# Patient Record
Sex: FEMALE | Race: WHITE | NOT HISPANIC OR LATINO | Employment: OTHER | URBAN - METROPOLITAN AREA
[De-identification: names, ages, dates, MRNs, and addresses within clinical notes are randomized per-mention and may not be internally consistent; named-entity substitution may affect disease eponyms.]

---

## 2023-12-26 ENCOUNTER — OFFICE VISIT (OUTPATIENT)
Dept: URGENT CARE | Facility: URGENT CARE | Age: 78
End: 2023-12-26
Payer: COMMERCIAL

## 2023-12-26 VITALS
BODY MASS INDEX: 19.29 KG/M2 | WEIGHT: 120 LBS | HEIGHT: 66 IN | HEART RATE: 74 BPM | DIASTOLIC BLOOD PRESSURE: 75 MMHG | SYSTOLIC BLOOD PRESSURE: 117 MMHG | TEMPERATURE: 98.1 F | OXYGEN SATURATION: 99 %

## 2023-12-26 DIAGNOSIS — S16.1XXD NECK STRAIN, SUBSEQUENT ENCOUNTER: Primary | ICD-10-CM

## 2023-12-26 DIAGNOSIS — M54.2 ACUTE NECK PAIN: ICD-10-CM

## 2023-12-26 PROCEDURE — 99203 OFFICE O/P NEW LOW 30 MIN: CPT | Performed by: PHYSICIAN ASSISTANT

## 2023-12-26 RX ORDER — CYCLOSPORINE 0.5 MG/ML
EMULSION OPHTHALMIC
COMMUNITY
Start: 2023-10-09

## 2023-12-26 RX ORDER — METOPROLOL SUCCINATE 25 MG/1
1 TABLET, EXTENDED RELEASE ORAL DAILY
COMMUNITY
Start: 2023-11-27

## 2023-12-26 RX ORDER — MONTELUKAST SODIUM 10 MG/1
10 TABLET ORAL DAILY
COMMUNITY
Start: 2023-11-27

## 2023-12-26 RX ORDER — OXYCODONE AND ACETAMINOPHEN 5; 325 MG/1; MG/1
1 TABLET ORAL EVERY 6 HOURS PRN
Qty: 12 TABLET | Refills: 0 | Status: SHIPPED | OUTPATIENT
Start: 2023-12-26 | End: 2023-12-29

## 2023-12-26 NOTE — PROGRESS NOTES
Assessment & Plan        1. Neck strain, subsequent encounter    -I have given patient a short course of Percocet treatment to last for 3 days.  No refills.  Patient should follow-up with Spine Specialist for further instructions and further evaluation.  - oxyCODONE-acetaminophen (PERCOCET) 5-325 MG tablet; Take 1 tablet by mouth every 6 hours as needed for pain  Dispense: 12 tablet; Refill: 0    2. Acute neck pain    - oxyCODONE-acetaminophen (PERCOCET) 5-325 MG tablet; Take 1 tablet by mouth every 6 hours as needed for pain  Dispense: 12 tablet; Refill: 0      Patient Instructions   Follow up with Spine Specialist for further evaluation     Return for Follow up with Spine Doctor.    At the end of the encounter, I discussed results, diagnosis, medications. Discussed red flags for immediate return to clinic/ER, as well as indications for follow up if no improvement. Patient understood and agreed to plan. Patient was stable for discharge.    Carlee Rondon is a 78 year old female who presents to clinic today for the following health issues:  Chief Complaint   Patient presents with    Urgent Care    Neck Pain     Neck pain that started in October, was seen at ED and told it was acute cervical strain  Pain has continued and worsened over the weekend      HPI  Patient reports neck pain, right worse than left. She was seen in the ED two months ago and has been following up  with Spine Specialist for neck pain. She has been doing physical therapy which has been helping. She is here today because the pain has worsened for the past two days.  No recent trauma or injury.  She notes pain with movement of the neck.  She is unable to move his neck from side-to-side due to pain. She rates the pain at 10/10 when worse .She notes trying flexeril which did not help. She has tried percocet 5/325 mg in the past which helped.  She denies fever, chills, headache, nausea, vomiting , lightheadedness or dizziness.        Review of  "Systems    Problem List:  There are no relevant problems documented for this patient.      No past medical history on file.    Social History     Tobacco Use    Smoking status: Never    Smokeless tobacco: Never   Substance Use Topics    Alcohol use: Not on file           Objective    /75   Pulse 74   Temp 98.1  F (36.7  C) (Temporal)   Ht 1.664 m (5' 5.5\")   Wt 54.4 kg (120 lb)   SpO2 99%   BMI 19.67 kg/m    Physical Exam  Vitals and nursing note reviewed.   Constitutional:       Appearance: Normal appearance.   Neck:     Cardiovascular:      Rate and Rhythm: Normal rate and regular rhythm.   Pulmonary:      Effort: Pulmonary effort is normal.      Breath sounds: Normal breath sounds.   Musculoskeletal:      Cervical back: Pain with movement and muscular tenderness present. No spinous process tenderness. Decreased range of motion.   Skin:     General: Skin is warm and dry.      Findings: No rash.   Neurological:      General: No focal deficit present.      Mental Status: She is alert and oriented to person, place, and time.      Cranial Nerves: Cranial nerve deficit present.      Sensory: Sensation is intact.      Motor: Weakness present.      Comments: Strength is 4/5 with head rotation an shoulder elevation bilaterally    Psychiatric:         Mood and Affect: Mood normal.         Behavior: Behavior normal.              Mary Lou Bahena PA-C    "

## 2024-07-11 ENCOUNTER — OFFICE VISIT (OUTPATIENT)
Dept: URGENT CARE | Facility: URGENT CARE | Age: 79
End: 2024-07-11
Payer: MEDICARE

## 2024-07-11 VITALS
DIASTOLIC BLOOD PRESSURE: 70 MMHG | TEMPERATURE: 97.5 F | HEART RATE: 74 BPM | WEIGHT: 125 LBS | SYSTOLIC BLOOD PRESSURE: 118 MMHG | BODY MASS INDEX: 20.09 KG/M2 | OXYGEN SATURATION: 99 % | RESPIRATION RATE: 16 BRPM | HEIGHT: 66 IN
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
## 2024-07-11 DIAGNOSIS — M25.562 ACUTE PAIN OF BOTH KNEES: Primary | ICD-10-CM

## 2024-07-11 DIAGNOSIS — M25.561 ACUTE PAIN OF BOTH KNEES: Primary | ICD-10-CM

## 2024-07-11 PROCEDURE — 99213 OFFICE O/P EST LOW 20 MIN: CPT | Performed by: PHYSICIAN ASSISTANT

## 2024-07-11 NOTE — PATIENT INSTRUCTIONS
Ice as needed and after activity.  Modify activity.  Mild pain is okay as long as it resolves after a minute or 2 of discontinuing the activity.  Decreased level activity if pain is moderate to severe or last longer than a few minutes after discontinuing.  Wear knee sleeves or brace like we showed you in the clinic  Follow-up with PT    Tylenol 500-1000 mg every 8 hours as needed    Voltaren gel 4 times a day for the next 1 to 2 weeks can be effective.  Has minimal side effects but can take a few days to begin working and have to be diligent about application.

## 2024-07-11 NOTE — PROGRESS NOTES
"Chief Complaint   Patient presents with    Urgent Care     Was doing an exercise on Tuesday evening. Knees gave out. Was okay yesterday but knees are in pain today, right side is worse.        ASSESSMENT/PLAN:  Nela was seen today for urgent care.    Diagnoses and all orders for this visit:    Acute pain of both knees  -     Physical Therapy  Referral; Future    Fall, initial encounter  -     Physical Therapy  Referral; Future    Bilateral MCL, LCL intact.  Normal lever test.  Slight swelling of the right superior medial aspect of the knee with some tenderness.  Suspect soft tissue injury from sudden flexion    Ice as needed and after activity.  Modify activity.  Mild pain is okay as long as it resolves after a minute or 2 of discontinuing the activity.  Decreased level activity if pain is moderate to severe or last longer than a few minutes after discontinuing.  Wear knee sleeves or brace like we showed you in the clinic  Follow-up with PT    Tylenol 500-1000 mg every 8 hours as needed    Voltaren gel 4 times a day for the next 1 to 2 weeks can be effective.  Has minimal side effects but can take a few days to begin working and have to be diligent about application.    Karthik Meng PA-C      SUBJECTIVE:  Nela is a 79 year old female who presents to urgent care with bilateral knee pain.  2 days ago she was doing wall squats when her knees gave out and she landed on her butt.  She had some mild pain initially afterward but was able to walk.  The next day she became more sore and stiff which has continued today.  Has been icing, wearing Ace bandages and decreasing activity level.    ROS: Pertinent ROS neg other than the symptoms noted above in the HPI.     OBJECTIVE:  /70   Pulse 74   Temp 97.5  F (36.4  C) (Temporal)   Resp 16   Ht 1.664 m (5' 5.5\")   Wt 56.7 kg (125 lb)   SpO2 99%   BMI 20.48 kg/m     GENERAL: alert and no distress  MS: Walks slowly,  Right knee:  MCL, LCL intact.  " Normal lever test.  Slight swelling of the right superior medial aspect of the knee with some tenderness.  No bony tenderness.  No pain with passive range of motion  Left knee: MCL, LCL intact.  Normal lever test.  No bony tenderness.  No pain with passive range of motion  SKIN: no suspicious lesions or rashes  NEURO: Normal strength and tone, mentation intact and speech normal    DIAGNOSTICS    No results found for any visits on 07/11/24.     Current Outpatient Medications   Medication Sig Dispense Refill    cycloSPORINE (RESTASIS) 0.05 % ophthalmic emulsion       FLUoxetine (PROZAC) 20 MG capsule take 1 tab po daily (20 mg) during spring and summer months, and increase to 2 tabs po daily (40 mg) during fall and winter months      metoprolol succinate ER (TOPROL XL) 25 MG 24 hr tablet Take 1 tablet by mouth daily      montelukast (SINGULAIR) 10 MG tablet Take 10 mg by mouth daily      rivaroxaban ANTICOAGULANT (XARELTO) 20 MG TABS tablet Take 20 mg by mouth       No current facility-administered medications for this visit.      There is no problem list on file for this patient.     No past medical history on file.  No past surgical history on file.  No family history on file.  Social History     Tobacco Use    Smoking status: Never    Smokeless tobacco: Never   Substance Use Topics    Alcohol use: Not on file              The plan of care was discussed with the patient. They understand and agree with the course of treatment prescribed. A printed summary was given including instructions and medications.  The use of Dragon/Jebbit dictation services may have been used to construct the content in this note; any grammatical or spelling errors are non-intentional. Please contact the author of this note directly if you are in need of any clarification.

## 2025-05-20 ENCOUNTER — RESULTS FOLLOW-UP (OUTPATIENT)
Dept: URGENT CARE | Facility: URGENT CARE | Age: 80
End: 2025-05-20

## 2025-05-20 ENCOUNTER — ANCILLARY PROCEDURE (OUTPATIENT)
Dept: GENERAL RADIOLOGY | Facility: CLINIC | Age: 80
End: 2025-05-20
Attending: FAMILY MEDICINE
Payer: COMMERCIAL

## 2025-05-20 ENCOUNTER — OFFICE VISIT (OUTPATIENT)
Dept: URGENT CARE | Facility: URGENT CARE | Age: 80
End: 2025-05-20
Payer: MEDICARE

## 2025-05-20 VITALS
SYSTOLIC BLOOD PRESSURE: 129 MMHG | RESPIRATION RATE: 16 BRPM | HEART RATE: 69 BPM | BODY MASS INDEX: 20.49 KG/M2 | WEIGHT: 123 LBS | OXYGEN SATURATION: 100 % | HEIGHT: 65 IN | TEMPERATURE: 99 F | DIASTOLIC BLOOD PRESSURE: 71 MMHG

## 2025-05-20 DIAGNOSIS — J18.9 ATYPICAL PNEUMONIA: ICD-10-CM

## 2025-05-20 DIAGNOSIS — M25.561 RIGHT KNEE PAIN, UNSPECIFIED CHRONICITY: ICD-10-CM

## 2025-05-20 DIAGNOSIS — R52 BODY ACHES: Primary | ICD-10-CM

## 2025-05-20 DIAGNOSIS — R50.9 FEVER IN ADULT: ICD-10-CM

## 2025-05-20 LAB
ALBUMIN SERPL-MCNC: 3.4 G/DL (ref 3.4–5)
ALBUMIN UR-MCNC: NEGATIVE MG/DL
ALP SERPL-CCNC: 67 U/L (ref 40–150)
ALT SERPL W P-5'-P-CCNC: 16 U/L (ref 0–50)
ANION GAP SERPL CALCULATED.3IONS-SCNC: 12 MMOL/L (ref 3–14)
APPEARANCE UR: CLEAR
AST SERPL W P-5'-P-CCNC: 28 U/L (ref 0–45)
BASOPHILS # BLD AUTO: 0 10E3/UL (ref 0–0.2)
BASOPHILS NFR BLD AUTO: 0 %
BILIRUB SERPL-MCNC: 0.8 MG/DL (ref 0.2–1.3)
BILIRUB UR QL STRIP: NEGATIVE
BUN SERPL-MCNC: 15 MG/DL (ref 7–30)
CALCIUM SERPL-MCNC: 9.3 MG/DL (ref 8.5–10.1)
CHLORIDE BLD-SCNC: 102 MMOL/L (ref 94–109)
CO2 SERPL-SCNC: 27 MMOL/L (ref 20–32)
COLOR UR AUTO: YELLOW
CREAT SERPL-MCNC: 0.9 MG/DL (ref 0.52–1.04)
EGFRCR SERPLBLD CKD-EPI 2021: 65 ML/MIN/1.73M2
EOSINOPHIL # BLD AUTO: 0 10E3/UL (ref 0–0.7)
EOSINOPHIL NFR BLD AUTO: 1 %
ERYTHROCYTE [DISTWIDTH] IN BLOOD BY AUTOMATED COUNT: 12.7 % (ref 10–15)
GLUCOSE BLD-MCNC: 96 MG/DL (ref 70–99)
GLUCOSE UR STRIP-MCNC: NEGATIVE MG/DL
HCT VFR BLD AUTO: 39.1 % (ref 35–47)
HGB BLD-MCNC: 13 G/DL (ref 11.7–15.7)
HGB UR QL STRIP: NEGATIVE
IMM GRANULOCYTES # BLD: 0 10E3/UL
IMM GRANULOCYTES NFR BLD: 0 %
KETONES UR STRIP-MCNC: NEGATIVE MG/DL
LEUKOCYTE ESTERASE UR QL STRIP: NEGATIVE
LYMPHOCYTES # BLD AUTO: 1.7 10E3/UL (ref 0.8–5.3)
LYMPHOCYTES NFR BLD AUTO: 28 %
MCH RBC QN AUTO: 33.5 PG (ref 26.5–33)
MCHC RBC AUTO-ENTMCNC: 33.2 G/DL (ref 31.5–36.5)
MCV RBC AUTO: 101 FL (ref 78–100)
MONOCYTES # BLD AUTO: 1.1 10E3/UL (ref 0–1.3)
MONOCYTES NFR BLD AUTO: 18 %
NEUTROPHILS # BLD AUTO: 3.2 10E3/UL (ref 1.6–8.3)
NEUTROPHILS NFR BLD AUTO: 53 %
NITRATE UR QL: NEGATIVE
PH UR STRIP: 6 [PH] (ref 5–7)
PLATELET # BLD AUTO: 264 10E3/UL (ref 150–450)
POTASSIUM BLD-SCNC: 4.8 MMOL/L (ref 3.4–5.3)
PROT SERPL-MCNC: 7.6 G/DL (ref 6.8–8.8)
RBC # BLD AUTO: 3.88 10E6/UL (ref 3.8–5.2)
SODIUM SERPL-SCNC: 141 MMOL/L (ref 135–145)
SP GR UR STRIP: 1.01 (ref 1–1.03)
UROBILINOGEN UR STRIP-ACNC: 0.2 E.U./DL
WBC # BLD AUTO: 6.1 10E3/UL (ref 4–11)

## 2025-05-20 PROCEDURE — 3078F DIAST BP <80 MM HG: CPT | Performed by: FAMILY MEDICINE

## 2025-05-20 PROCEDURE — 3074F SYST BP LT 130 MM HG: CPT | Performed by: FAMILY MEDICINE

## 2025-05-20 PROCEDURE — 36415 COLL VENOUS BLD VENIPUNCTURE: CPT | Performed by: FAMILY MEDICINE

## 2025-05-20 PROCEDURE — 99214 OFFICE O/P EST MOD 30 MIN: CPT | Performed by: FAMILY MEDICINE

## 2025-05-20 PROCEDURE — 71046 X-RAY EXAM CHEST 2 VIEWS: CPT | Mod: TC | Performed by: RADIOLOGY

## 2025-05-20 PROCEDURE — 81003 URINALYSIS AUTO W/O SCOPE: CPT | Performed by: FAMILY MEDICINE

## 2025-05-20 PROCEDURE — 80053 COMPREHEN METABOLIC PANEL: CPT | Performed by: FAMILY MEDICINE

## 2025-05-20 PROCEDURE — 85025 COMPLETE CBC W/AUTO DIFF WBC: CPT | Performed by: FAMILY MEDICINE

## 2025-05-20 RX ORDER — AZITHROMYCIN 250 MG/1
TABLET, FILM COATED ORAL
Qty: 6 TABLET | Refills: 0 | Status: SHIPPED | OUTPATIENT
Start: 2025-05-20 | End: 2025-05-25

## 2025-05-20 NOTE — PROGRESS NOTES
Urgent Care Clinic Visit    Chief Complaint   Patient presents with    Fever     X5 days of on and fever     Generalized Body Aches     Body aches   Worse in right knee and lower back     Urgent Care               5/20/2025     2:21 PM   Additional Questions   Roomed by aston browning

## 2025-05-20 NOTE — PROGRESS NOTES
Chief Complaint   Patient presents with    Fever     X5 days of on and fever     Generalized Body Aches     Body aches   Worse in right knee and lower back     Urgent Care     Nela was seen today for fever, generalized body aches and urgent care.    Diagnoses and all orders for this visit:    Body aches    Fever in adult  -     CBC with platelets and differential; Future  -     Comprehensive metabolic panel (BMP + Alb, Alk Phos, ALT, AST, Total. Bili, TP); Future  -     UA Macroscopic with reflex to Microscopic and Culture - Clinic Collect  -     XR Chest 2 Views  -     CBC with platelets and differential  -     Comprehensive metabolic panel (BMP + Alb, Alk Phos, ALT, AST, Total. Bili, TP)    Right knee pain, unspecified chronicity    Atypical pneumonia  -     azithromycin (ZITHROMAX) 250 MG tablet; Take 2 tablets (500 mg) by mouth daily for 1 day, THEN 1 tablet (250 mg) daily for 4 days.      D/d  Bronchitis-viral, Influenza, Pneumonia, Viral pharyngitis, Viral syndrome, Viral upper respiratory illness, uti, pyelonephritis, viral myalgia, septic arthritis,bakers cyst    PLAN:  Labs reviewed with patient no abnormality noted x-ray did show mild opacity in the right lower lung but there is no baseline x-ray to compare with patient does not have any URI symptoms will wait for the official read.  Discussed with patient to repeat a COVID test, push more fluids, the pain behind the knee could be related to Baker's cyst at this point no further testing would be needed.  Symptomatic therapy suggested: push fluids, rest, use acetaminophen as needed, and Return office visit if symptoms persist or worsen. Call or return to clinic prn if these symptoms worsen or fail to improve as anticipated.    did spent>35 minutes with patient and > 50% of the time was for answering questions, discussing findings, counseling and coordination of care     SUBJECTIVE:   Nela Mera is a 79 year old female of A-fib on 2 is here in the  clinic with history of 6 days of on and off fever with generalized myalgia, left low back pain then the back pain spread to the entire lower back along with right knee pain.  She denies any URI symptoms or UTI symptoms but does have some urinary frequency.  Denies any cough, chest pain, shortness of breath did have a bowel movement has no nausea vomiting symptoms.       OBJECTIVE:  She appears well, vital signs are as noted by the nurse. Ears normal.  Throat and pharynx normal.  Neck supple. No adenopathy in the neck. Nose is congested. Sinuses non tender.   The chest is clear, without wheezes or rales.  Cv-normal S1-S2 no murmur heard  Extremities no pedal edema noted   right knee there was no redness swelling noted but there was some limitation in the range of motion of the right knee mostly with extension.  There is pain mostly in the posterior aspect of the right knee mild warmth noted in the right knee versus the left knee.    Desirae Melo MD